# Patient Record
Sex: FEMALE | Race: BLACK OR AFRICAN AMERICAN | NOT HISPANIC OR LATINO
[De-identification: names, ages, dates, MRNs, and addresses within clinical notes are randomized per-mention and may not be internally consistent; named-entity substitution may affect disease eponyms.]

---

## 2021-04-09 PROBLEM — Z00.00 ENCOUNTER FOR PREVENTIVE HEALTH EXAMINATION: Status: ACTIVE | Noted: 2021-04-09

## 2021-04-23 ENCOUNTER — APPOINTMENT (OUTPATIENT)
Dept: ORTHOPEDIC SURGERY | Facility: CLINIC | Age: 66
End: 2021-04-23
Payer: MEDICARE

## 2021-04-23 VITALS — WEIGHT: 215 LBS | HEIGHT: 67 IN | BODY MASS INDEX: 33.74 KG/M2

## 2021-04-23 DIAGNOSIS — G89.29 PAIN IN RIGHT SHOULDER: ICD-10-CM

## 2021-04-23 DIAGNOSIS — M25.511 PAIN IN RIGHT SHOULDER: ICD-10-CM

## 2021-04-23 PROCEDURE — 73030 X-RAY EXAM OF SHOULDER: CPT | Mod: RT

## 2021-04-23 PROCEDURE — 99072 ADDL SUPL MATRL&STAF TM PHE: CPT

## 2021-04-23 PROCEDURE — 20610 DRAIN/INJ JOINT/BURSA W/O US: CPT | Mod: RT

## 2021-04-23 PROCEDURE — 99203 OFFICE O/P NEW LOW 30 MIN: CPT | Mod: 25

## 2021-04-23 NOTE — HISTORY OF PRESENT ILLNESS
[de-identified] : Eliana Cabrales, 66 year old female presents today complaining of right shoulder pain. Pain began over 6 months ago and has progressively worsened. Denies injury or trauma. Patient has history of a stroke with residual left sided weakness. Patient uses cane to ambulate using her right arm only due to residual left sided weakness. Patient states that overhead activities aggravate her arm and she cannot lift her arm above her head. Patient has tried rest and OTC pain medications with minimal relief. She has not received any treatment for this issue in the past. Denies numbness, tingling. \par \par The patient's past medical history, past surgical history, medications, allergies, and social history were reviewed by me today with the patient and documented accordingly. In addition, the patient's family history, which is noncontributory to this visit, was also reviewed.\par

## 2021-04-23 NOTE — DISCUSSION/SUMMARY
[de-identified] : Right shoulder pain she does have impingement findings no weakness with rotator cuff testing we discussed diagnostic and treatment options. She requested injection today\par \par Injection: Right shoulder (Subacromial).\par Indication: [Impingement\par A discussion was had with the patient regarding this procedure and all questions were answered. All risks, benefits and alternatives were discussed. These included but were not limited to bleeding, infection, and allergic reaction. Alcohol was used to clean the skin, and betadine was used to sterilize and prep the area in the posterior aspect of the right shoulder. Ethyl chloride spray was then used as a topical anesthetic. A 21-gauge needle was used to inject 4cc of 1% lidocaine and 1cc of 40mg/ml methylprednisolone into the right subacromial space. A sterile bandage was then applied. The patient tolerated the procedure well and there were no complications. \par \par Physical therapy. Tylenol for pain. Followup as needed

## 2021-04-23 NOTE — PHYSICAL EXAM
[de-identified] : General Exam\par \par Well developed, well nourished\par No apparent distress\par Oriented to person, place, and time\par Mood: Normal\par Affect: Normal\par Balance and coordination: Normal\par Gait: Normal\par \par Right shoulder exam\par \par Inspection: No swelling, ecchymosis or gross deformity.\par Skin: No masses, No lesions\par Tenderness: No bicipital tenderness, no tenderness to the greater tuberosity/RTC insertion, no anterior shoulder/lesser tuberosity tenderness. No tenderness SC joint, clavicle, AC joint.\par ROM: 90/60/T6. symmetric er/ir w arm in 90 deg abd to other side. passive fe 150\par Impingement tests: Positive Carbajal\par instability: neg post load and shift neg nancy/jerk\par AC Joint: no pain with cross arm testing\par Biceps: Negative speed\par Strength: 5/5 abduction, external rotation, and internal rotation\par Neuro: AIN, PIN, Ulnar nerve motor intact\par Sensation: Intact to light touch in radial, median, ulnar, and axillary nerve distributions\par Vasc: 2+ radial pulse\par  [de-identified] : \par The following radiographs were ordered and read by me during this patients visit. I reviewed each radiograph in detail with the patient and discussed the findings as highlighted below. \par 3 views right shoulder were obtained today that show no fracture, dislocation. There is no degenerative change seen. There is no malalignment. No obvious osseous abnormality. Otherwise unremarkable.

## 2021-12-19 ENCOUNTER — FORM ENCOUNTER (OUTPATIENT)
Age: 66
End: 2021-12-19

## 2022-01-22 ENCOUNTER — APPOINTMENT (OUTPATIENT)
Dept: SLEEP CENTER | Facility: CLINIC | Age: 67
End: 2022-01-22

## 2022-07-19 ENCOUNTER — APPOINTMENT (OUTPATIENT)
Dept: PULMONOLOGY | Facility: CLINIC | Age: 67
End: 2022-07-19

## 2022-07-19 VITALS
TEMPERATURE: 98 F | HEART RATE: 63 BPM | HEIGHT: 67 IN | SYSTOLIC BLOOD PRESSURE: 174 MMHG | RESPIRATION RATE: 18 BRPM | BODY MASS INDEX: 28.25 KG/M2 | DIASTOLIC BLOOD PRESSURE: 82 MMHG | WEIGHT: 180 LBS | OXYGEN SATURATION: 97 %

## 2022-07-19 DIAGNOSIS — G47.33 OBSTRUCTIVE SLEEP APNEA (ADULT) (PEDIATRIC): ICD-10-CM

## 2022-07-19 DIAGNOSIS — R06.83 SNORING: ICD-10-CM

## 2022-07-19 PROCEDURE — 99204 OFFICE O/P NEW MOD 45 MIN: CPT

## 2022-07-19 NOTE — CONSULT LETTER
[Dear  ___] : Dear  [unfilled], [Consult Letter:] : I had the pleasure of evaluating your patient, [unfilled]. [Please see my note below.] : Please see my note below. [Consult Closing:] : Thank you very much for allowing me to participate in the care of this patient.  If you have any questions, please do not hesitate to contact me. [Sincerely,] : Sincerely, [FreeTextEntry3] : Mari Beltran MD

## 2022-07-19 NOTE — HISTORY OF PRESENT ILLNESS
[FreeTextEntry1] : 66yo F with history of heavy snoring, nocturnal gasping, choking, witnessed apneas. She complains of nasal congestion as well. She also complains of sleep fragmentation throughout the night. she also complains of excessive daytime sleepiness when engaged in passive activities. She sometimes complains of headaches in the morning when she wakes up. \par \par Has a history of HTN, CAD s/p MI, T2DM

## 2022-07-19 NOTE — REVIEW OF SYSTEMS
[Negative] : Psychiatric [EDS: ESS=____] : daytime somnolence: ESS=[unfilled] [Fatigue] : fatigue [Nasal Congestion] : nasal congestion [Snoring] : snoring [Witnessed Apneas] : witnessed apnea [A.M. Dry Mouth] : a.m. dry mouth [Diabetes] : diabetes  [A.M. Headache] : headache present upon awakening [Heartburn] : heartburn [Difficulty Maintaining Sleep] : difficulty maintaining sleep [Difficulty Initiating Sleep] : no difficulty falling asleep [Lower Extremity Discomfort] : no lower extremity discomfort [Unusual Sleep Behavior] : no unusual sleep behavior [Cataplexy] :  no cataplexy

## 2022-07-19 NOTE — ASSESSMENT
[FreeTextEntry1] : 68yo F with history of heavy snoring, nocturnal gasping, choking, witnessed apneas. She complains of nasal congestion as well. She also complains of sleep fragmentation throughout the night. she also complains of excessive daytime sleepiness when engaged in passive activities. She sometimes complains of headaches in the morning when she wakes up. \par \par Has a history of HTN, CAD s/p MI, T2DM\par \par Will order SPLIT study\par I explained the rationale for treatment of MIGDALIA -- to improve quality of life, daytime function and to decrease the cardiometabolic and other medical risks that are associated with untreated MIGDALIA. The patient verbalized understanding.\par I also explained that the patient can expect a follow up call once results of the above study becomes available.\par

## 2023-03-26 ENCOUNTER — APPOINTMENT (OUTPATIENT)
Dept: SLEEP CENTER | Facility: CLINIC | Age: 68
End: 2023-03-26